# Patient Record
Sex: FEMALE | Race: BLACK OR AFRICAN AMERICAN
[De-identification: names, ages, dates, MRNs, and addresses within clinical notes are randomized per-mention and may not be internally consistent; named-entity substitution may affect disease eponyms.]

---

## 2021-07-19 ENCOUNTER — HOSPITAL ENCOUNTER (EMERGENCY)
Dept: HOSPITAL 54 - ER | Age: 51
Discharge: HOME | End: 2021-07-19
Payer: COMMERCIAL

## 2021-07-19 VITALS — WEIGHT: 197 LBS | BODY MASS INDEX: 32.82 KG/M2 | HEIGHT: 65 IN

## 2021-07-19 VITALS — DIASTOLIC BLOOD PRESSURE: 72 MMHG | SYSTOLIC BLOOD PRESSURE: 127 MMHG

## 2021-07-19 DIAGNOSIS — K80.20: Primary | ICD-10-CM

## 2021-07-19 DIAGNOSIS — Z90.89: ICD-10-CM

## 2021-07-19 LAB
ALBUMIN SERPL BCP-MCNC: 3.4 G/DL (ref 3.4–5)
ALP SERPL-CCNC: 63 U/L (ref 46–116)
ALT SERPL W P-5'-P-CCNC: 18 U/L (ref 12–78)
AST SERPL W P-5'-P-CCNC: 12 U/L (ref 15–37)
BASOPHILS # BLD AUTO: 0 K/UL (ref 0–0.2)
BASOPHILS NFR BLD AUTO: 0.5 % (ref 0–2)
BILIRUB DIRECT SERPL-MCNC: 0.1 MG/DL (ref 0–0.2)
BILIRUB SERPL-MCNC: 0.2 MG/DL (ref 0.2–1)
BUN SERPL-MCNC: 15 MG/DL (ref 7–18)
CALCIUM SERPL-MCNC: 8.6 MG/DL (ref 8.5–10.1)
CHLORIDE SERPL-SCNC: 107 MMOL/L (ref 98–107)
CO2 SERPL-SCNC: 27 MMOL/L (ref 21–32)
CREAT SERPL-MCNC: 0.8 MG/DL (ref 0.6–1.3)
EOSINOPHIL NFR BLD AUTO: 1.5 % (ref 0–6)
GLUCOSE SERPL-MCNC: 99 MG/DL (ref 74–106)
HCT VFR BLD AUTO: 36 % (ref 33–45)
HGB BLD-MCNC: 11.8 G/DL (ref 11.5–14.8)
LYMPHOCYTES NFR BLD AUTO: 2.2 K/UL (ref 0.8–4.8)
LYMPHOCYTES NFR BLD AUTO: 29.2 % (ref 20–44)
MCHC RBC AUTO-ENTMCNC: 33 G/DL (ref 31–36)
MCV RBC AUTO: 86 FL (ref 82–100)
MONOCYTES NFR BLD AUTO: 0.5 K/UL (ref 0.1–1.3)
MONOCYTES NFR BLD AUTO: 6.4 % (ref 2–12)
NEUTROPHILS # BLD AUTO: 4.7 K/UL (ref 1.8–8.9)
NEUTROPHILS NFR BLD AUTO: 62.4 % (ref 43–81)
PLATELET # BLD AUTO: 320 K/UL (ref 150–450)
POTASSIUM SERPL-SCNC: 4 MMOL/L (ref 3.5–5.1)
PROT SERPL-MCNC: 7.3 G/DL (ref 6.4–8.2)
RBC # BLD AUTO: 4.21 MIL/UL (ref 4–5.2)
SODIUM SERPL-SCNC: 140 MMOL/L (ref 136–145)
WBC NRBC COR # BLD AUTO: 7.5 K/UL (ref 4.3–11)

## 2022-07-12 ENCOUNTER — HOSPITAL ENCOUNTER (EMERGENCY)
Dept: HOSPITAL 54 - ER | Age: 52
LOS: 1 days | Discharge: HOME | End: 2022-07-13
Payer: MEDICAID

## 2022-07-12 VITALS — BODY MASS INDEX: 30.82 KG/M2 | HEIGHT: 65 IN | WEIGHT: 185 LBS

## 2022-07-12 DIAGNOSIS — K80.50: ICD-10-CM

## 2022-07-12 DIAGNOSIS — Z79.899: ICD-10-CM

## 2022-07-12 DIAGNOSIS — Z60.2: ICD-10-CM

## 2022-07-12 DIAGNOSIS — R07.89: Primary | ICD-10-CM

## 2022-07-12 DIAGNOSIS — Z90.89: ICD-10-CM

## 2022-07-12 LAB
ALBUMIN SERPL BCP-MCNC: 3.9 G/DL (ref 3.4–5)
ALP SERPL-CCNC: 70 U/L (ref 46–116)
ALT SERPL W P-5'-P-CCNC: 19 U/L (ref 12–78)
AST SERPL W P-5'-P-CCNC: 15 U/L (ref 15–37)
BASOPHILS # BLD AUTO: 0 K/UL (ref 0–0.2)
BASOPHILS NFR BLD AUTO: 0.3 % (ref 0–2)
BILIRUB DIRECT SERPL-MCNC: 0.1 MG/DL (ref 0–0.2)
BILIRUB SERPL-MCNC: 0.3 MG/DL (ref 0.2–1)
BILIRUB UR QL STRIP: NEGATIVE
BUN SERPL-MCNC: 10 MG/DL (ref 7–18)
CALCIUM SERPL-MCNC: 9.1 MG/DL (ref 8.5–10.1)
CHLORIDE SERPL-SCNC: 103 MMOL/L (ref 98–107)
CO2 SERPL-SCNC: 28 MMOL/L (ref 21–32)
COLOR UR: YELLOW
CREAT SERPL-MCNC: 0.9 MG/DL (ref 0.6–1.3)
EOSINOPHIL NFR BLD AUTO: 1.3 % (ref 0–6)
GLUCOSE SERPL-MCNC: 127 MG/DL (ref 74–106)
GLUCOSE UR STRIP-MCNC: NEGATIVE MG/DL
HCT VFR BLD AUTO: 37 % (ref 33–45)
HGB BLD-MCNC: 12.1 G/DL (ref 11.5–14.8)
LEUKOCYTE ESTERASE UR QL STRIP: NEGATIVE
LIPASE SERPL-CCNC: 103 U/L (ref 73–393)
LYMPHOCYTES NFR BLD AUTO: 3.1 K/UL (ref 0.8–4.8)
LYMPHOCYTES NFR BLD AUTO: 35.3 % (ref 20–44)
MCHC RBC AUTO-ENTMCNC: 33 G/DL (ref 31–36)
MCV RBC AUTO: 85 FL (ref 82–100)
MONOCYTES NFR BLD AUTO: 0.6 K/UL (ref 0.1–1.3)
MONOCYTES NFR BLD AUTO: 7.1 % (ref 2–12)
NEUTROPHILS # BLD AUTO: 4.9 K/UL (ref 1.8–8.9)
NEUTROPHILS NFR BLD AUTO: 56 % (ref 43–81)
NITRITE UR QL STRIP: NEGATIVE
PH UR STRIP: 8 [PH] (ref 5–8)
PLATELET # BLD AUTO: 313 K/UL (ref 150–450)
POTASSIUM SERPL-SCNC: 3.2 MMOL/L (ref 3.5–5.1)
PROT SERPL-MCNC: 8.5 G/DL (ref 6.4–8.2)
PROT UR QL STRIP: NEGATIVE MG/DL
RBC # BLD AUTO: 4.31 MIL/UL (ref 4–5.2)
SODIUM SERPL-SCNC: 139 MMOL/L (ref 136–145)
UROBILINOGEN UR STRIP-MCNC: 0.2 EU/DL
WBC NRBC COR # BLD AUTO: 8.7 K/UL (ref 4.3–11)

## 2022-07-12 PROCEDURE — 80076 HEPATIC FUNCTION PANEL: CPT

## 2022-07-12 PROCEDURE — 96375 TX/PRO/DX INJ NEW DRUG ADDON: CPT

## 2022-07-12 PROCEDURE — 81003 URINALYSIS AUTO W/O SCOPE: CPT

## 2022-07-12 PROCEDURE — 84484 ASSAY OF TROPONIN QUANT: CPT

## 2022-07-12 PROCEDURE — 76705 ECHO EXAM OF ABDOMEN: CPT

## 2022-07-12 PROCEDURE — 85025 COMPLETE CBC W/AUTO DIFF WBC: CPT

## 2022-07-12 PROCEDURE — 96374 THER/PROPH/DIAG INJ IV PUSH: CPT

## 2022-07-12 PROCEDURE — 99285 EMERGENCY DEPT VISIT HI MDM: CPT

## 2022-07-12 PROCEDURE — 93005 ELECTROCARDIOGRAM TRACING: CPT

## 2022-07-12 PROCEDURE — 83690 ASSAY OF LIPASE: CPT

## 2022-07-12 PROCEDURE — 71275 CT ANGIOGRAPHY CHEST: CPT

## 2022-07-12 PROCEDURE — 80048 BASIC METABOLIC PNL TOTAL CA: CPT

## 2022-07-12 PROCEDURE — 36415 COLL VENOUS BLD VENIPUNCTURE: CPT

## 2022-07-12 PROCEDURE — 85378 FIBRIN DEGRADE SEMIQUANT: CPT

## 2022-07-12 PROCEDURE — 71045 X-RAY EXAM CHEST 1 VIEW: CPT

## 2022-07-12 SDOH — SOCIAL STABILITY - SOCIAL INSECURITY: PROBLEMS RELATED TO LIVING ALONE: Z60.2

## 2022-07-12 NOTE — NUR
[id number] FROM HOME C/O MID UPPER ABD PAIN X 30 MIN RAD TO RIGHT FLANK. GIVEN 
100 MCG FENTANYL IV PTA. PT IS A/O X 4 RR EVEN AND UNALBORED, NO SOB NOTED. 
PATIENT TAKEN TO ER BED 09. PATIENT CONNECTED TO ALL MONITORS.

## 2022-07-13 ENCOUNTER — HOSPITAL ENCOUNTER (INPATIENT)
Dept: HOSPITAL 54 - ER | Age: 52
LOS: 2 days | Discharge: HOME | DRG: 284 | End: 2022-07-15
Attending: INTERNAL MEDICINE | Admitting: INTERNAL MEDICINE
Payer: MEDICAID

## 2022-07-13 VITALS — BODY MASS INDEX: 30.82 KG/M2 | HEIGHT: 65 IN | WEIGHT: 185 LBS

## 2022-07-13 VITALS — SYSTOLIC BLOOD PRESSURE: 119 MMHG | DIASTOLIC BLOOD PRESSURE: 83 MMHG

## 2022-07-13 VITALS — DIASTOLIC BLOOD PRESSURE: 69 MMHG | SYSTOLIC BLOOD PRESSURE: 122 MMHG

## 2022-07-13 VITALS — DIASTOLIC BLOOD PRESSURE: 79 MMHG | SYSTOLIC BLOOD PRESSURE: 122 MMHG

## 2022-07-13 DIAGNOSIS — E66.9: ICD-10-CM

## 2022-07-13 DIAGNOSIS — K80.70: Primary | ICD-10-CM

## 2022-07-13 DIAGNOSIS — Z20.822: ICD-10-CM

## 2022-07-13 DIAGNOSIS — Z90.49: ICD-10-CM

## 2022-07-13 DIAGNOSIS — Z91.018: ICD-10-CM

## 2022-07-13 DIAGNOSIS — K82.8: ICD-10-CM

## 2022-07-13 DIAGNOSIS — E78.5: ICD-10-CM

## 2022-07-13 DIAGNOSIS — Z88.8: ICD-10-CM

## 2022-07-13 DIAGNOSIS — Z91.013: ICD-10-CM

## 2022-07-13 LAB
ALBUMIN SERPL BCP-MCNC: 3.7 G/DL (ref 3.4–5)
ALP SERPL-CCNC: 105 U/L (ref 46–116)
ALT SERPL W P-5'-P-CCNC: 173 U/L (ref 12–78)
AST SERPL W P-5'-P-CCNC: 312 U/L (ref 15–37)
BASOPHILS # BLD AUTO: 0 K/UL (ref 0–0.2)
BASOPHILS NFR BLD AUTO: 0.3 % (ref 0–2)
BILIRUB DIRECT SERPL-MCNC: 0.3 MG/DL (ref 0–0.2)
BILIRUB SERPL-MCNC: 0.8 MG/DL (ref 0.2–1)
BUN SERPL-MCNC: 10 MG/DL (ref 7–18)
CALCIUM SERPL-MCNC: 9.2 MG/DL (ref 8.5–10.1)
CHLORIDE SERPL-SCNC: 102 MMOL/L (ref 98–107)
CO2 SERPL-SCNC: 21 MMOL/L (ref 21–32)
CREAT SERPL-MCNC: 0.8 MG/DL (ref 0.6–1.3)
EOSINOPHIL NFR BLD AUTO: 0.4 % (ref 0–6)
GLUCOSE SERPL-MCNC: 104 MG/DL (ref 74–106)
HCT VFR BLD AUTO: 41 % (ref 33–45)
HGB BLD-MCNC: 13.1 G/DL (ref 11.5–14.8)
LIPASE SERPL-CCNC: 84 U/L (ref 73–393)
LYMPHOCYTES NFR BLD AUTO: 1.1 K/UL (ref 0.8–4.8)
LYMPHOCYTES NFR BLD AUTO: 12 % (ref 20–44)
MCHC RBC AUTO-ENTMCNC: 32 G/DL (ref 31–36)
MCV RBC AUTO: 87 FL (ref 82–100)
MONOCYTES NFR BLD AUTO: 0.6 K/UL (ref 0.1–1.3)
MONOCYTES NFR BLD AUTO: 6.7 % (ref 2–12)
NEUTROPHILS # BLD AUTO: 7.4 K/UL (ref 1.8–8.9)
NEUTROPHILS NFR BLD AUTO: 80.6 % (ref 43–81)
PLATELET # BLD AUTO: 276 K/UL (ref 150–450)
POTASSIUM SERPL-SCNC: 3.9 MMOL/L (ref 3.5–5.1)
PROT SERPL-MCNC: 8.3 G/DL (ref 6.4–8.2)
RBC # BLD AUTO: 4.67 MIL/UL (ref 4–5.2)
SODIUM SERPL-SCNC: 134 MMOL/L (ref 136–145)
WBC NRBC COR # BLD AUTO: 9.1 K/UL (ref 4.3–11)

## 2022-07-13 PROCEDURE — C9803 HOPD COVID-19 SPEC COLLECT: HCPCS

## 2022-07-13 PROCEDURE — G0378 HOSPITAL OBSERVATION PER HR: HCPCS

## 2022-07-13 PROCEDURE — A9537 TC99M MEBROFENIN: HCPCS

## 2022-07-13 PROCEDURE — C9113 INJ PANTOPRAZOLE SODIUM, VIA: HCPCS

## 2022-07-13 RX ADMIN — Medication PRN MG: at 19:39

## 2022-07-13 RX ADMIN — PIPERACILLIN SODIUM AND TAZOBACTAM SODIUM SCH MLS/HR: .375; 3 INJECTION, POWDER, LYOPHILIZED, FOR SOLUTION INTRAVENOUS at 20:20

## 2022-07-13 NOTE — NUR
RN NOTES



RECEIVED PATIENT FROM EMERGENCY DEPARTMENT IN STABLE CONDITION. TRANSFERRED VIA GURNEY. 
PATIENT ON RA TOLERATING WELL WITH NO S/SX OF RESP DISTRESS OR SOB NOTED. NO COMPLAINT OF 
PAIN AT THIS TIME. A/O X4. ENGLISH AND Irish SPEAKING. SKIN CLEAR AND INTACT. WARM TO 
TOUCH AND NORMAL COLOR APPROPRIATE TO RACE. L FA G#20 INTACT AND PATENT. SAFETY MEASURES IN 
PLACE. BED IN LOWEST POSITION, WHEELS LOCKED, SIDE RAILS UP X2, CALL LIGHT WITHIN REACH. 
WILL CONTINUE TO MONITOR

## 2022-07-13 NOTE — NUR
MS/RN OPENING NOTE



RECEIVED PATIENT RESTING IN BED. AWAKE, ALERT AND ORIENTED X 4. ABLE TO MAKE NEEDS KNOWN. 
C/O ABDOMINAL PAIN 7/10 - WILL ADMINISTER PRN PAIN MEDICATION PER MD ORDER. CONTINUES ON 
ROOM AIR WITH NO S/SX OF RESPIRATORY DISTRESS NOTED. IV ACCESS TO LEFT FOREARM #20G INTACT 
AND PATENT. CONTINUES ON IVF D5 1/2 NS @ 75ML/HR. CONTINUES ON IV ABX. CONTINUES ON NPO 
STATUS. PATIENT IS AMBULATORY WITH STEADY GAIT. CALL LIGHT WITHIN REACH. ASPIRATION, FALL 
AND SAFETY PRECAUTIONS MAINTAINED. ALL NEEDS ATTENDED TO AT THIS TIME.

## 2022-07-13 NOTE — NUR
MS/RN NOTE



OBTAINED CONSENT FOR POSSIBLE HIDA SCAN TOMORROW AND PLACED IN CHART. PATIENT CURRENTLY ON 
NPO STATUS.

## 2022-07-13 NOTE — NUR
MS/RN NOTE



ADMINISTERED PRN MORPHINE PER MD ORDERS FOR ABDOMINAL PAIN 8/10. ALL NEEDS ATTENDED TO AT 
THIS TIME.

## 2022-07-13 NOTE — NUR
Patient discharged to home in stable condition. RX Written and verbal after 
care instructions given. Patient verbalizes understanding of instruction. IV 
removed. Catheter intact and site benign. Pressure and 4x4 applied to site. No 
bleeding noted. PT ambulatory with a steady gait

## 2022-07-13 NOTE — NUR
RN NOTES



PATIENT STARTED ON FLUIDS. D5 1/2 NS @ 75ML/HR, LEFT FA G#20 INFUSING WELL. WILL CONTINUE TO 
MONITOR

## 2022-07-14 VITALS — SYSTOLIC BLOOD PRESSURE: 109 MMHG | DIASTOLIC BLOOD PRESSURE: 62 MMHG

## 2022-07-14 VITALS — DIASTOLIC BLOOD PRESSURE: 75 MMHG | SYSTOLIC BLOOD PRESSURE: 148 MMHG

## 2022-07-14 VITALS — SYSTOLIC BLOOD PRESSURE: 122 MMHG | DIASTOLIC BLOOD PRESSURE: 67 MMHG

## 2022-07-14 LAB
BASOPHILS # BLD AUTO: 0 K/UL (ref 0–0.2)
BASOPHILS NFR BLD AUTO: 0.7 % (ref 0–2)
BUN SERPL-MCNC: 8 MG/DL (ref 7–18)
CALCIUM SERPL-MCNC: 8.8 MG/DL (ref 8.5–10.1)
CHLORIDE SERPL-SCNC: 104 MMOL/L (ref 98–107)
CHOLEST SERPL-MCNC: 255 MG/DL (ref ?–200)
CO2 SERPL-SCNC: 26 MMOL/L (ref 21–32)
CREAT SERPL-MCNC: 0.8 MG/DL (ref 0.6–1.3)
EOSINOPHIL NFR BLD AUTO: 1.7 % (ref 0–6)
GLUCOSE SERPL-MCNC: 94 MG/DL (ref 74–106)
HCT VFR BLD AUTO: 36 % (ref 33–45)
HDLC SERPL-MCNC: 50 MG/DL (ref 40–60)
HGB BLD-MCNC: 11.7 G/DL (ref 11.5–14.8)
LDLC SERPL DIRECT ASSAY-MCNC: 167 MG/DL (ref 0–99)
LYMPHOCYTES NFR BLD AUTO: 1.5 K/UL (ref 0.8–4.8)
LYMPHOCYTES NFR BLD AUTO: 30.8 % (ref 20–44)
MAGNESIUM SERPL-MCNC: 2.1 MG/DL (ref 1.8–2.4)
MCHC RBC AUTO-ENTMCNC: 33 G/DL (ref 31–36)
MCV RBC AUTO: 85 FL (ref 82–100)
MONOCYTES NFR BLD AUTO: 0.4 K/UL (ref 0.1–1.3)
MONOCYTES NFR BLD AUTO: 7.8 % (ref 2–12)
NEUTROPHILS # BLD AUTO: 2.9 K/UL (ref 1.8–8.9)
NEUTROPHILS NFR BLD AUTO: 59 % (ref 43–81)
PHOSPHATE SERPL-MCNC: 3.7 MG/DL (ref 2.5–4.9)
PLATELET # BLD AUTO: 279 K/UL (ref 150–450)
POTASSIUM SERPL-SCNC: 3.6 MMOL/L (ref 3.5–5.1)
RBC # BLD AUTO: 4.21 MIL/UL (ref 4–5.2)
SODIUM SERPL-SCNC: 139 MMOL/L (ref 136–145)
TRIGL SERPL-MCNC: 135 MG/DL (ref 30–150)
TSH SERPL DL<=0.005 MIU/L-ACNC: 2.58 UIU/ML (ref 0.36–3.74)
WBC NRBC COR # BLD AUTO: 4.9 K/UL (ref 4.3–11)

## 2022-07-14 RX ADMIN — SODIUM CHLORIDE SCH MG: 9 INJECTION, SOLUTION INTRAVENOUS at 08:03

## 2022-07-14 RX ADMIN — Medication PRN MG: at 06:08

## 2022-07-14 RX ADMIN — PIPERACILLIN SODIUM AND TAZOBACTAM SODIUM SCH MLS/HR: .375; 3 INJECTION, POWDER, LYOPHILIZED, FOR SOLUTION INTRAVENOUS at 20:27

## 2022-07-14 RX ADMIN — PIPERACILLIN SODIUM AND TAZOBACTAM SODIUM SCH MLS/HR: .375; 3 INJECTION, POWDER, LYOPHILIZED, FOR SOLUTION INTRAVENOUS at 11:55

## 2022-07-14 RX ADMIN — ACETAMINOPHEN PRN MG: 160 SOLUTION ORAL at 16:12

## 2022-07-14 RX ADMIN — PIPERACILLIN SODIUM AND TAZOBACTAM SODIUM SCH MLS/HR: .375; 3 INJECTION, POWDER, LYOPHILIZED, FOR SOLUTION INTRAVENOUS at 04:18

## 2022-07-14 RX ADMIN — ACETAMINOPHEN PRN MG: 160 SOLUTION ORAL at 09:30

## 2022-07-14 NOTE — NUR
MS/RN CLOSING NOTE

PATIENT RESTING IN BED. PATIENT IS ALERT AND ORIENTED X 4. ABLE TO MAKE NEEDS KNOWN . 
CONTINUES ON ROOM AIR  NO S/SX OF RESPIRATORY DISTRESS NOTED. IV ACCESS TO LEFT FOREARM #20G 
INTACT AND PATENT. CONTINUES ON IVF KCL 10 MEQ D5 NS @ 100 ML/HR.  NPO . PATIENT IS 
AMBULATORY .HIDA TEST DONE. HUSBANDAT HER BED SIDE. ALL SAFETY MEASURES IN PLACE. CALL LIGHT 
WITHIN REACH. ASPIRATION, FALL AND SAFETY PRECAUTIONS MAINTAINED. WILL ENDORSE FOR NASRIN.

## 2022-07-14 NOTE — NUR
MS/RN CLOSING NOTE



PATIENT CURRENTLY SLEEPING IN BED. ALERT AND ORIENTED X 4. ABLE TO MAKE NEEDS KNOWN. DENIES 
PAIN AT THIS TIME. CONTINUES ON ROOM AIR WITH NO S/SX OF RESPIRATORY DISTRESS NOTED. IV 
ACCESS TO LEFT FOREARM #20G INTACT AND PATENT. CONTINUES ON IVF D5 1/2 NS @ 75ML/HR. 
CONTINUES ON IV ABX. CONTINUES ON NPO STATUS. PATIENT IS AMBULATORY WITH STEADY GAIT. CALL 
LIGHT WITHIN REACH. ASPIRATION, FALL AND SAFETY PRECAUTIONS MAINTAINED. ALL NEEDS ATTENDED 
TO AT THIS TIME. WILL ENDORSE PLAN OF CARE TO ONCOMING SHIFT RN.

## 2022-07-14 NOTE — NUR
MS/RN OPENING NOTE



RECEIVED PATIENT RESTING IN BED. AWAKE, ALERT AND ORIENTED X 4. ABLE TO MAKE NEEDS KNOWN. 
FAMILY AT BEDSIDE. CONTINUES ON ROOM AIR WITH NO S/SX OF RESPIRATORY DISTRESS NOTED. IV 
ACCESS TO LEFT FOREARM #20G INTACT AND PATENT. CONTINUES ON IVF D5 NS WITH 20MEQ KCL @ 
100ML/HR. CONTINUES ON IV ABX. CONTINUES ON NPO STATUS. PATIENT IS AMBULATORY WITH STEADY 
GAIT. CALL LIGHT WITHIN REACH. ASPIRATION, FALL AND SAFETY PRECAUTIONS MAINTAINED. ALL NEEDS 
ATTENDED TO AT THIS TIME.

## 2022-07-14 NOTE — NUR
MS/RN NOTE



PATIENT WITH C/O NAUSEA. NO VOMITING NOTED. ADMINISTERED PRN ZOFRAN PER MD ORDER. ALL NEEDS 
ATTENDED TO AT THIS TIME.

## 2022-07-14 NOTE — NUR
MS/RN OPENING NOTE



RECEIVED PATIENT RESTING IN BED. PATIENT IS ALERT AND ORIENTED X 4. ABLE TO MAKE NEEDS KNOWN 
. CONTINUES ON ROOM AIR  NO S/SX OF RESPIRATORY DISTRESS NOTED. IV ACCESS TO LEFT FOREARM 
#20G INTACT AND PATENT. CONTINUES ON IVF D5 1/2 NS @ 75ML/HR.  NPO . PATIENT IS AMBULATORY . 
ALL SAFETY MEASURES IN PLACE. CALL LIGHT WITHIN REACH. ASPIRATION, FALL AND SAFETY 
PRECAUTIONS MAINTAINED. WILL CONTINUE TO MONITOR.

## 2022-07-14 NOTE — NUR
MS/RN NOTE



PATIENT WITH C/O INCREASED HEADACHE PAIN. PAIN SCALE RATING NOW 8/10. ADMINISTERED PRN 
MORPHINE PER MD ORDERS.

## 2022-07-14 NOTE — NUR
MS/RN NOTE



PATIENT WITH C/O HEADACHE WITH PAIN LEVEL 3/10. PATIENT DOES NOT WANT MORPHINE AT THIS TIME. 
CONTINUES ON NPO STATUS. PROVIDED HEAT PACK PER PATIENT REQUEST. ALL NEEDS ATTENDED TO AT 
THIS TIME.

## 2022-07-15 VITALS — SYSTOLIC BLOOD PRESSURE: 104 MMHG | DIASTOLIC BLOOD PRESSURE: 58 MMHG

## 2022-07-15 VITALS — SYSTOLIC BLOOD PRESSURE: 115 MMHG | DIASTOLIC BLOOD PRESSURE: 69 MMHG

## 2022-07-15 VITALS — DIASTOLIC BLOOD PRESSURE: 58 MMHG | SYSTOLIC BLOOD PRESSURE: 104 MMHG

## 2022-07-15 LAB
ALBUMIN SERPL BCP-MCNC: 3.3 G/DL (ref 3.4–5)
ALP SERPL-CCNC: 109 U/L (ref 46–116)
ALT SERPL W P-5'-P-CCNC: 421 U/L (ref 12–78)
AST SERPL W P-5'-P-CCNC: 161 U/L (ref 15–37)
BASOPHILS # BLD AUTO: 0 K/UL (ref 0–0.2)
BASOPHILS NFR BLD AUTO: 0.4 % (ref 0–2)
BILIRUB SERPL-MCNC: 0.6 MG/DL (ref 0.2–1)
BUN SERPL-MCNC: 9 MG/DL (ref 7–18)
CALCIUM SERPL-MCNC: 8.5 MG/DL (ref 8.5–10.1)
CHLORIDE SERPL-SCNC: 106 MMOL/L (ref 98–107)
CO2 SERPL-SCNC: 28 MMOL/L (ref 21–32)
CREAT SERPL-MCNC: 0.9 MG/DL (ref 0.6–1.3)
EOSINOPHIL NFR BLD AUTO: 2.4 % (ref 0–6)
GLUCOSE SERPL-MCNC: 94 MG/DL (ref 74–106)
HCT VFR BLD AUTO: 34 % (ref 33–45)
HGB BLD-MCNC: 11.4 G/DL (ref 11.5–14.8)
LYMPHOCYTES NFR BLD AUTO: 1.5 K/UL (ref 0.8–4.8)
LYMPHOCYTES NFR BLD AUTO: 27.6 % (ref 20–44)
MAGNESIUM SERPL-MCNC: 2.1 MG/DL (ref 1.8–2.4)
MCHC RBC AUTO-ENTMCNC: 33 G/DL (ref 31–36)
MCV RBC AUTO: 85 FL (ref 82–100)
MONOCYTES NFR BLD AUTO: 0.4 K/UL (ref 0.1–1.3)
MONOCYTES NFR BLD AUTO: 7.4 % (ref 2–12)
NEUTROPHILS # BLD AUTO: 3.3 K/UL (ref 1.8–8.9)
NEUTROPHILS NFR BLD AUTO: 62.2 % (ref 43–81)
PLATELET # BLD AUTO: 282 K/UL (ref 150–450)
POTASSIUM SERPL-SCNC: 3.2 MMOL/L (ref 3.5–5.1)
PROT SERPL-MCNC: 7.1 G/DL (ref 6.4–8.2)
RBC # BLD AUTO: 4.05 MIL/UL (ref 4–5.2)
SODIUM SERPL-SCNC: 142 MMOL/L (ref 136–145)
WBC NRBC COR # BLD AUTO: 5.4 K/UL (ref 4.3–11)

## 2022-07-15 RX ADMIN — POTASSIUM CHLORIDE SCH MLS/HR: 200 INJECTION, SOLUTION INTRAVENOUS at 11:40

## 2022-07-15 RX ADMIN — PIPERACILLIN SODIUM AND TAZOBACTAM SODIUM SCH MLS/HR: .375; 3 INJECTION, POWDER, LYOPHILIZED, FOR SOLUTION INTRAVENOUS at 14:59

## 2022-07-15 RX ADMIN — POTASSIUM CHLORIDE SCH MLS/HR: 200 INJECTION, SOLUTION INTRAVENOUS at 13:55

## 2022-07-15 RX ADMIN — SODIUM CHLORIDE SCH MG: 9 INJECTION, SOLUTION INTRAVENOUS at 08:46

## 2022-07-15 RX ADMIN — PIPERACILLIN SODIUM AND TAZOBACTAM SODIUM SCH MLS/HR: .375; 3 INJECTION, POWDER, LYOPHILIZED, FOR SOLUTION INTRAVENOUS at 04:22

## 2022-07-15 RX ADMIN — POTASSIUM CHLORIDE SCH MLS/HR: 200 INJECTION, SOLUTION INTRAVENOUS at 13:01

## 2022-07-15 RX ADMIN — POTASSIUM CHLORIDE SCH MLS/HR: 200 INJECTION, SOLUTION INTRAVENOUS at 10:38

## 2022-07-15 RX ADMIN — ACETAMINOPHEN PRN MG: 160 SOLUTION ORAL at 04:22

## 2022-07-15 NOTE — NUR
RN OPENING NOTE

PATIENT IS IN BED AWAKE, ALERT ORIENTED X 4.  ON ROOM AIR, WITH OXYGEN SATURATION AT 96%. 
DENIES PAIN, BREATHING UNLABORED AND NOT IN ANY FORM OF DISTRESS. CURRENTLY ON NPO PER 
PATIENT REQUEST AS SHE WOULD LIKE TO SPEAK WITH HER PHYSICIAN FIRST TO CLARIFY SURGICAL 
PLANS. I EXPLAINED TO HER THERE IS NO PLAN YET FOR SURGERY.  IV ON RIGHT FOREARM INTACT AND 
PATENT, NO SIGNS OF INFILTRATION NOTED. BED IS LOCKED IN LOWEST POSITION, 3 SIDE RAILS UP, 
CALL LIGHT WITHIN REACH. WILL CONTINUE TO MONITOR THROUGHOUT SHIFT.

## 2022-07-15 NOTE — NUR
RN CLOSING NOTE

PATIENT IS DISCHARGED TO HOME IN STABLE CONDITION. IV LINE DISCONTINUED AND CANNULA IS 
INTACT. IV SITE COVERED WITH DRY DRESSING. PATIENT WAS ACCOMPANIED TO LOBBY AND WAS ASSISTED 
TO HER CAR DRIVEN BY HER . DISCHARGE INSTRUCTIONS GIVEN TO PATIENT.. BELONGINGS 
CHECKLIST COMPLETED AND SIGNED BY PATIENT. DISCHARGE VITAL SIGNS: /69, ME 66, RR 18 O2 
SAT 97%.

## 2022-07-15 NOTE — NUR
MS/RN NOTE



PATIENT WITH C/O PAIN TO LEFT FOREARM IV SITE. NO REDNESS OR INFILTRATION NOTED. PATIENT 
REQUESTING TO HAVE IV SITE CHANGED. INSERTED NEW IV #20G TO RIGHT FOREARM. DISCONTINUED IV 
TO LEFT FOREARM WITH PRESSURE DRESSING APPLIED. MINIMAL DRAINAGE NOTED. PATIENT TOLERATED 
WELL.

## 2022-07-15 NOTE — NUR
MS/RN CLOSING NOTE



PATIENT CURRENTLY SLEEPING IN BED. ALERT AND ORIENTED X 4. ABLE TO MAKE NEEDS KNOWN. DENIES 
PAIN AT THIS TIME. CONTINUES ON ROOM AIR WITH NO S/SX OF RESPIRATORY DISTRESS NOTED. IV 
ACCESS TO RIGHT FOREARM #20G INTACT AND PATENT. CONTINUES ON IVF D5 NS WITH 10MEQ KCL @ 
100ML/HR. CONTINUES ON IV ABX. CONTINUES ON NPO STATUS. PATIENT IS AMBULATORY WITH STEADY 
GAIT. CALL LIGHT WITHIN REACH. ASPIRATION, FALL AND SAFETY PRECAUTIONS MAINTAINED. WILL 
ENDORSE PLAN OF CARE TO ONCOMING SHIFT RN.